# Patient Record
Sex: MALE | Race: WHITE | URBAN - METROPOLITAN AREA
[De-identification: names, ages, dates, MRNs, and addresses within clinical notes are randomized per-mention and may not be internally consistent; named-entity substitution may affect disease eponyms.]

---

## 2017-12-04 ENCOUNTER — OFFICE VISIT (OUTPATIENT)
Dept: DERMATOLOGY | Age: 47
End: 2017-12-04

## 2017-12-04 DIAGNOSIS — L57.0 AK (ACTINIC KERATOSIS): ICD-10-CM

## 2017-12-04 DIAGNOSIS — L21.9 SEBORRHEIC DERMATITIS: ICD-10-CM

## 2017-12-04 DIAGNOSIS — L81.2 FRECKLES: Primary | ICD-10-CM

## 2017-12-04 DIAGNOSIS — L82.1 SEBORRHEIC KERATOSES: ICD-10-CM

## 2017-12-04 DIAGNOSIS — D22.9 MULTIPLE NEVI: ICD-10-CM

## 2017-12-04 PROCEDURE — 17003 DESTRUCT PREMALG LES 2-14: CPT | Performed by: DERMATOLOGY

## 2017-12-04 PROCEDURE — 17000 DESTRUCT PREMALG LESION: CPT | Performed by: DERMATOLOGY

## 2017-12-04 PROCEDURE — 99203 OFFICE O/P NEW LOW 30 MIN: CPT | Performed by: DERMATOLOGY

## 2017-12-04 RX ORDER — ATORVASTATIN CALCIUM 20 MG/1
80 TABLET, FILM COATED ORAL
COMMUNITY
Start: 2017-11-29 | End: 2022-06-20

## 2017-12-04 RX ORDER — LOSARTAN POTASSIUM AND HYDROCHLOROTHIAZIDE 12.5; 1 MG/1; MG/1
1 TABLET ORAL
COMMUNITY
Start: 2017-03-14 | End: 2021-07-13

## 2017-12-04 RX ORDER — KETOCONAZOLE 20 MG/G
CREAM TOPICAL
Qty: 30 G | Refills: 1 | Status: SHIPPED | OUTPATIENT
Start: 2017-12-04 | End: 2020-01-09 | Stop reason: SDUPTHER

## 2017-12-04 NOTE — PROGRESS NOTES
Nails/digits and buttocks. The following were examined and determined to be abnormal: Head/face, RUE and LUE.   trunk and extremities with scattered brown macules and papules and tan macules  FA\"s with scattered erythematous roughly scaled macules  FH/glabella with mild ill-defined erythema and fine dry scale    Assessment and Plan     1. Freckles, few benign-appearing nevi and few small SK's  - educ re ABCD's of MM   educ sun protection   encouraged skin check yearly (sooner if indicated), self checks    2. AK's - arms  - 5 lesion(s) on the arms treated with liquid nitrogen x 2 cycles. Patient educated on risk of blister, hypopigmentation/scar and wound care.       3. seb derm - mild  - ketocon cream daily - bid

## 2018-12-04 ENCOUNTER — OFFICE VISIT (OUTPATIENT)
Dept: DERMATOLOGY | Age: 48
End: 2018-12-04
Payer: COMMERCIAL

## 2018-12-04 DIAGNOSIS — L21.9 SEBORRHEIC DERMATITIS: ICD-10-CM

## 2018-12-04 DIAGNOSIS — L73.9 FOLLICULITIS: ICD-10-CM

## 2018-12-04 DIAGNOSIS — D22.9 MULTIPLE NEVI: ICD-10-CM

## 2018-12-04 DIAGNOSIS — L57.0 AK (ACTINIC KERATOSIS): ICD-10-CM

## 2018-12-04 DIAGNOSIS — L81.2 FRECKLES: Primary | ICD-10-CM

## 2018-12-04 DIAGNOSIS — L82.1 SEBORRHEIC KERATOSIS: ICD-10-CM

## 2018-12-04 PROCEDURE — 99214 OFFICE O/P EST MOD 30 MIN: CPT | Performed by: DERMATOLOGY

## 2018-12-04 PROCEDURE — 17003 DESTRUCT PREMALG LES 2-14: CPT | Performed by: DERMATOLOGY

## 2018-12-04 PROCEDURE — 1036F TOBACCO NON-USER: CPT | Performed by: DERMATOLOGY

## 2018-12-04 PROCEDURE — 17000 DESTRUCT PREMALG LESION: CPT | Performed by: DERMATOLOGY

## 2018-12-04 PROCEDURE — G8484 FLU IMMUNIZE NO ADMIN: HCPCS | Performed by: DERMATOLOGY

## 2018-12-04 PROCEDURE — G8421 BMI NOT CALCULATED: HCPCS | Performed by: DERMATOLOGY

## 2018-12-04 PROCEDURE — G8427 DOCREV CUR MEDS BY ELIG CLIN: HCPCS | Performed by: DERMATOLOGY

## 2018-12-04 RX ORDER — CLINDAMYCIN PHOSPHATE 11.9 MG/ML
SOLUTION TOPICAL
Qty: 60 ML | Refills: 3 | Status: SHIPPED | OUTPATIENT
Start: 2018-12-04

## 2018-12-04 NOTE — PATIENT INSTRUCTIONS
Protecting Yourself From the Sun    · Apply broad spectrum water resistant sunscreen with an SPF of at least 30 to exposed areas of the skin. Dont forget the ears and lips! Remember to reapply sunscreen about every 2 hours and after swimming or sweating. · Wear sun protective clothing. Swim shirts (aka. rash guards) are a great idea and negates the need to reapply sunscreen in those areas. · Seek the shade whenever possible especially between the hours of 10 am and 4 pm when the suns rays are the strongest.     · Avoid tanning beds      Cryosurgery (Freezing) Wound Care Instructions    AFTER THE PROCEDURE:    You will notice swelling and redness around the site. This is normal.    You may experience a sharp or sore feeling for the next several days. For this discomfort, you may take acetaminophen (Tylenol©).  A blister may develop at the treated area, sometimes as soon as by the end of the day. After several days, the blister will subside and a scab will form.  If the area is bumped or traumatized during the first few days following freezing, you may develop bleeding into the blister, forming a blood blister. This is nothing to be alarmed about.  If the blister is tense, uncomfortable, or much larger than the site that was frozen, you may pop the blister along its edge with a sterile needle (boiled, heated under a flame, or cleaned with alcohol) to allow the fluid to drain out. If the blister does not bother you, no treatment is needed.  Do NOT peel off the top of the blister roof. It will act as a dressing on top of your wound. WOUND CARE:    You may shower or bathe as usual, but avoid scrubbing the areas that have been frozen.  Cleanse the site twice a day with mild soapy water, and then apply a thin film of white petrolatum (Vaseline©).  You do not need to cover the area, but can if you prefer.     Do NOT allow the site to become dry or crusted, or attempt to dry it out with

## 2020-01-09 ENCOUNTER — OFFICE VISIT (OUTPATIENT)
Dept: DERMATOLOGY | Age: 50
End: 2020-01-09
Payer: COMMERCIAL

## 2020-01-09 PROCEDURE — G8484 FLU IMMUNIZE NO ADMIN: HCPCS | Performed by: DERMATOLOGY

## 2020-01-09 PROCEDURE — 99214 OFFICE O/P EST MOD 30 MIN: CPT | Performed by: DERMATOLOGY

## 2020-01-09 PROCEDURE — G8421 BMI NOT CALCULATED: HCPCS | Performed by: DERMATOLOGY

## 2020-01-09 PROCEDURE — 11102 TANGNTL BX SKIN SINGLE LES: CPT | Performed by: DERMATOLOGY

## 2020-01-09 PROCEDURE — 17003 DESTRUCT PREMALG LES 2-14: CPT | Performed by: DERMATOLOGY

## 2020-01-09 PROCEDURE — 17000 DESTRUCT PREMALG LESION: CPT | Performed by: DERMATOLOGY

## 2020-01-09 PROCEDURE — G8427 DOCREV CUR MEDS BY ELIG CLIN: HCPCS | Performed by: DERMATOLOGY

## 2020-01-09 PROCEDURE — 1036F TOBACCO NON-USER: CPT | Performed by: DERMATOLOGY

## 2020-01-09 RX ORDER — KETOCONAZOLE 20 MG/G
CREAM TOPICAL
Qty: 30 G | Refills: 1 | Status: SHIPPED | OUTPATIENT
Start: 2020-01-09

## 2020-01-09 NOTE — PATIENT INSTRUCTIONS
Biopsy Wound Care Instructions    · Keep the bandage in place for 24 hours. · Cleanse the wound with mild soapy water daily   Gently dry the area.  Apply Vaseline or petroleum jelly to the wound using a cotton tipped applicator.  Cover with a clean bandage.  Repeat this process until the biopsy site is healed.  If you had stitches placed, continue treating the site until the stitches are removed. Remember to make an appointment to return to have your stitches removed by our staff.  You may shower and bathe as usual.       ** Biopsy results generally take around 7 business days to come back. If you have not heard from us by then, please call the office at [phone] between 8AM and 4PM Monday through Friday. Cryosurgery (Freezing) Wound Care Instructions    AFTER THE PROCEDURE:    You will notice swelling and redness around the site. This is normal.    You may experience a sharp or sore feeling for the next several days. For this discomfort, you may take acetaminophen (Tylenol©).  A blister may develop at the treated area, sometimes as soon as by the end of the day. After several days, the blister will subside and a scab will form.  If the area is bumped or traumatized during the first few days following freezing, you may develop bleeding into the blister, forming a blood blister. This is nothing to be alarmed about.  If the blister is tense, uncomfortable, or much larger than the site that was frozen, you may pop the blister along its edge with a sterile needle (boiled, heated under a flame, or cleaned with alcohol) to allow the fluid to drain out. If the blister does not bother you, no treatment is needed.  Do NOT peel off the top of the blister roof. It will act as a dressing on top of your wound. WOUND CARE:    You may shower or bathe as usual, but avoid scrubbing the areas that have been frozen.      Cleanse the site twice a day with mild soapy water, and then apply a

## 2020-01-09 NOTE — PROGRESS NOTES
[facility]  Ivy Smith MD  [address]   1970    54 y.o. male     Date of Visit: 1/9/2020    Last seen: ; his wife and son are patients as well    Chief Complaint: moles/lesions  Chief Complaint   Patient presents with    Skin Exam     History of Present Illness:    1. Here for evaluation of multiple asx pigmented lesions on the trunk and extremities, present for many years; no change in size/shape/color of any lesions; no bleeding lesions. 2. He has a few rough lesions on the face. Asx.     3. F/u seb derm - intermittently dry and flaky skin on the FH/glabella. Improved with ketocon cream. Hasn't used recently. Flaring some om the temples/FH. 4. He has a concerning lesion on the L nasal sidewall x 4 mos. Asx. Hx of folliculitis - scalp - no recent probs. No personal hx of skin cancer. Dad with hx of skin cancer - likely NMSC. Maternal grandmother with hx of several lesions on the lips. Wears sunscreen regularly. No tanning bed use. Works at [employer] in [employer]. Has 2 children - go to [employer]. Senior (wanting to go to med school) and [employer] in 2019 - 2020. Review of Systems:  Gen: Feels well, good sense of health. Skin: No changing moles or lesions. No new rashes. Past Medical History, Family History, Surgical History, Medications and Allergies reviewed. Outpatient Medications Marked as Taking for the 1/9/20 encounter (Office Visit) with David Herrera MD   Medication Sig Dispense Refill    atorvastatin (LIPITOR) 20 MG tablet Take 20 mg by mouth      losartan-hydrochlorothiazide (HYZAAR) 100-12.5 MG per tablet Take 1 tablet by mouth       Allergies   Allergen Reactions    Keflex [Cephalexin] Rash     Young child       Past Medical History:   Diagnosis Date    Hyperlipidemia     Hypertension      History reviewed. No pertinent surgical history.     Physical Examination     Gen, well-appearing  The following were examined and determined to be normal: Psych/Neuro, Scalp/hair, Conjunctivae/eyelids, Gums/teeth/lips, Neck, Breast/axilla/chest, Abdomen, Back, RLE, LLE, Nails/digits and buttocks. The following were examined and determined to be abnormal: Head/face, RUE and LUE.   trunk and extremities with scattered brown macules and papules and tan macules  Temples and FH with erythematous roughly scaled macules  FH/glabella clear  Scalp clear  L nasal sidewall with pink scaly slightly depressed macule  Temples and FH with ill-defined scaly erythemaotus skin    Assessment and Plan     1. Freckles, few benign-appearing nevi and few small SK's  - educ re ABCD's of MM   educ sun protection   encouraged skin check yearly (sooner if indicated), self checks    2. AK's - temples and FH  - 4 lesion(s) on the arms treated with liquid nitrogen x 2 cycles. Patient educated on risk of blister, hypopigmentation/scar and wound care. 3. seb derm - flaring mildly  - resume ketocon cream daily - bid    4. L nasal sidewall - r/o BCC  - Shave biopsy performed after verbal consent obtained. Patient educated regarding risk of bleeding, infection, scar and educated on wound care. Skin cleansed with alcohol pad and site anesthetized with lido + epi. Aluminum chloride applied to site for hemostasis. Petrolatum ointment and bandage applied. Specimen bottle labeled with patient information and site and specimen sent to dermpath.

## 2020-01-21 ENCOUNTER — TELEPHONE (OUTPATIENT)
Dept: DERMATOLOGY | Age: 50
End: 2020-01-21

## 2021-01-14 ENCOUNTER — OFFICE VISIT (OUTPATIENT)
Dept: DERMATOLOGY | Age: 51
End: 2021-01-14
Payer: COMMERCIAL

## 2021-01-14 VITALS — TEMPERATURE: 97.2 F

## 2021-01-14 DIAGNOSIS — L57.0 AK (ACTINIC KERATOSIS): ICD-10-CM

## 2021-01-14 DIAGNOSIS — L81.2 FRECKLES: Primary | ICD-10-CM

## 2021-01-14 DIAGNOSIS — L21.9 SEBORRHEIC DERMATITIS: ICD-10-CM

## 2021-01-14 DIAGNOSIS — D22.9 MULTIPLE NEVI: ICD-10-CM

## 2021-01-14 DIAGNOSIS — D48.5 NEOPLASM OF UNCERTAIN BEHAVIOR OF SKIN: ICD-10-CM

## 2021-01-14 DIAGNOSIS — L82.1 SEBORRHEIC KERATOSIS: ICD-10-CM

## 2021-01-14 PROCEDURE — 11102 TANGNTL BX SKIN SINGLE LES: CPT | Performed by: DERMATOLOGY

## 2021-01-14 PROCEDURE — 17003 DESTRUCT PREMALG LES 2-14: CPT | Performed by: DERMATOLOGY

## 2021-01-14 PROCEDURE — 99213 OFFICE O/P EST LOW 20 MIN: CPT | Performed by: DERMATOLOGY

## 2021-01-14 PROCEDURE — 17000 DESTRUCT PREMALG LESION: CPT | Performed by: DERMATOLOGY

## 2021-01-14 NOTE — PATIENT INSTRUCTIONS

## 2021-01-14 NOTE — PROGRESS NOTES
[facility]  Vidal Jensen MD  [address]   1970    52 y.o. male     Date of Visit: 1/14/2021    Last seen: 1-2020    Chief Complaint: moles/lesions  Chief Complaint   Patient presents with    Skin Lesion     FSE      HX: multi  nevi     History of Present Illness:    1. Here for evaluation of multiple asx pigmented lesions on the trunk and extremities, present for many years; no change in size/shape/color of any lesions; no bleeding lesions. 2. He has scattered rough lesions on the face. Asx. Hx of AK's.    3. F/u seb derm - intermittently dry and flaky skin on the FH/glabella. Improves some with ketocon cream. Uses intermittently. Flaring some om the temples/FH. 4. He has a concerning lesion on the L nasal sidewall since last year. bx'd 1-2020 - read as AK and SK. Still has a concerning papule here. Asx. Hx of folliculitis - scalp - no recent probs. No personal hx of skin cancer. Dad with hx of skin cancer - likely NMSC. Maternal grandmother with hx of several lesions on the lips. Wears sunscreen regularly. No tanning bed use. Works at [employer] in [employer]. Has 2 children - go to [employer]. Freshman at [employer] (wanting to go to med school) and HS senior in 2020 - 2021. His wife and son Gordy Valencia) are patients as well    Review of Systems:  Gen: Feels well, good sense of health. Skin: No changing moles or lesions. No new rashes. Past Medical History, Family History, Surgical History, Medications and Allergies reviewed.     Outpatient Medications Marked as Taking for the 1/14/21 encounter (Office Visit) with Delicia Bass MD   Medication Sig Dispense Refill    ketoconazole (NIZORAL) 2 % cream Apply to affected area BID PRN flares 30 g 1    atorvastatin (LIPITOR) 20 MG tablet Take 20 mg by mouth      losartan-hydrochlorothiazide (HYZAAR) 100-12.5 MG per tablet Take 1 tablet by mouth       Allergies   Allergen Reactions    Keflex [Cephalexin] Rash     Young child       Past Medical History:   Diagnosis Date    Hyperlipidemia     Hypertension      No past surgical history on file. Physical Examination     Gen, well-appearing  The following were examined and determined to be normal: Psych/Neuro, Scalp/hair, Conjunctivae/eyelids, Gums/teeth/lips, Neck, Breast/axilla/chest, Abdomen, Back, RLE, LLE, Nails/digits and buttocks. The following were examined and determined to be abnormal: Head/face, RUE and LUE.   trunk and extremities with scattered brown macules and papules and tan macules  Temples and FH with erythematous roughly scaled macules  Scalp clear  L nasal sidewall with pink scaly slightly depressed papule  Glabella, temples and FH with ill-defined scaly erythemaotus skin    Assessment and Plan     1. Freckles, benign-appearing nevi and few small SK's  - educ re si/sx/ABCD's of MM   educ sun protection - OTC sunscreen with SPF 30-50+ recommended and reviewed usage  encouraged skin check yearly (sooner if indicated), self checks    2. AK's - temples and FH  - 8 lesion(s) on the arms treated with liquid nitrogen x 2 cycles. Patient educated on risk of blister, hypopigmentation/scar and wound care. - consider efudex if still multiple lesions in the future  - cont sun protection as above    3. seb derm - mild and stable  - cont ketocon cream daily - bid  - add HC 1% bid prn flares; ed se/misuse    4. L nasal sidewall - r/o AK vs NMSC  - bx 1-2020 - read as AK  - Shave biopsy performed after verbal consent obtained. Patient educated regarding risk of bleeding, infection, scar and educated on wound care. Skin cleansed with alcohol pad and site anesthetized with lido + epi. Aluminum chloride applied to site for hemostasis. Petrolatum ointment and bandage applied. Specimen bottle labeled with patient information and site and specimen sent to [facility].

## 2021-01-25 ENCOUNTER — TELEPHONE (OUTPATIENT)
Dept: DERMATOLOGY | Age: 51
End: 2021-01-25

## 2021-01-25 DIAGNOSIS — C44.311 BASAL CELL CARCINOMA (BCC) OF SKIN OF NOSE: Primary | ICD-10-CM

## 2021-01-25 NOTE — TELEPHONE ENCOUNTER
Left voicemail to return call regarding bx result(s) of:    Left nasal sidewall-nodular basal cell carcinoma. To Dr. Antony Sierra for [facility].

## 2021-01-26 NOTE — TELEPHONE ENCOUNTER
I spoke with the patient today. He and Gudelia Roche keep missing each other. I gave him his biopsy results:     Left nasal sidewall-nodular basal cell carcinoma. To Dr. Felipe Waddell for [facility]. He will wait a week or so for Dr. Ayanna Sawyer office to schedule Mohs. He scheduled a 6 month tbse. If he has any questions, he will call back. If Gudelia Roche has anything to add, she may call him.

## 2021-02-04 ENCOUNTER — TELEPHONE (OUTPATIENT)
Dept: SURGERY | Age: 51
End: 2021-02-04

## 2021-02-08 ENCOUNTER — TELEPHONE (OUTPATIENT)
Dept: SURGERY | Age: 51
End: 2021-02-08

## 2021-03-31 ENCOUNTER — OFFICE VISIT (OUTPATIENT)
Dept: PRIMARY CARE CLINIC | Age: 51
End: 2021-03-31
Payer: COMMERCIAL

## 2021-03-31 DIAGNOSIS — Z01.818 PREOP EXAMINATION: Primary | ICD-10-CM

## 2021-03-31 LAB — SARS-COV-2: NOT DETECTED

## 2021-03-31 PROCEDURE — G8421 BMI NOT CALCULATED: HCPCS | Performed by: NURSE PRACTITIONER

## 2021-03-31 PROCEDURE — 99211 OFF/OP EST MAY X REQ PHY/QHP: CPT | Performed by: NURSE PRACTITIONER

## 2021-03-31 PROCEDURE — G8428 CUR MEDS NOT DOCUMENT: HCPCS | Performed by: NURSE PRACTITIONER

## 2021-04-05 ENCOUNTER — TELEPHONE (OUTPATIENT)
Dept: SURGERY | Age: 51
End: 2021-04-05

## 2021-04-05 NOTE — TELEPHONE ENCOUNTER
Confirmed with patient that covid test was negative and ok to proceed with Mohs surgery on 04/06/21.

## 2021-04-06 ENCOUNTER — PROCEDURE VISIT (OUTPATIENT)
Dept: SURGERY | Age: 51
End: 2021-04-06
Payer: COMMERCIAL

## 2021-04-06 VITALS — DIASTOLIC BLOOD PRESSURE: 100 MMHG | TEMPERATURE: 97.6 F | SYSTOLIC BLOOD PRESSURE: 157 MMHG | HEART RATE: 85 BPM

## 2021-04-06 DIAGNOSIS — C44.311 BASAL CELL CARCINOMA (BCC) OF LEFT NASAL SIDEWALL: Primary | ICD-10-CM

## 2021-04-06 PROCEDURE — 17311 MOHS 1 STAGE H/N/HF/G: CPT | Performed by: DERMATOLOGY

## 2021-04-06 NOTE — PATIENT INSTRUCTIONS
[facility] Office Hours:    Monday-Thursday  7:30 AM-4:30 PM    Friday  9:00 AM-1:00 PM    DAILY WOUND CARE-SECOND INTENTION    1.  Keep the area absolutely dry for 24 to 48 hours. -After 24 to 48 hours you may remove the bandage and shower. 2.  Remove the bandage and clean the wound with mild soap and water. Try to clean off crust and debris. -After one week, you may rub the surface of the wound fairly aggressively (a small amount of bleeding is normal when you do this). It is important not to allow a scab to form as scabs slow down the healing process. Dry the area with a clean Q-tip or gauze. 3.  Apply a layer of Vaseline (or Bacitracin if your doctor recommends) to the wound area  only. 4.  Cut a piece of Telfa (or any non-stick dressing) to fit just over the wound and secure it with paper tape. If the wound is small you may use a Band-Aid    You should continue daily wound care and keep a bandage on until new skin has grown over the entire wound    ? Bleeding: If bleeding occurs, DO NOT remove the bandage. Put firm pressure on the area with gauze for 20 minutes without peeking. If the bleeding continues, apply pressure for 20 minutes more. ? If the bleeding does not stop after you apply pressure, call us right away. If you cant call, go to the nearest emergency room or urgent care facility. POST-OPERATIVE INSTRUCTIONS     1. Activity: Do not lift anything heavier than a gallon of milk for 1 week. Also, avoid strenuous activity such as running, power walking or contact sports. 2.  Eating and drinking: Do not drink alcohol for 48 hours after your procedure. Alcohol increases the chances of bleeding. 3.  Medicines:  -If you have discomfort, take acetaminophen (Tylenol or Extra Strength Tylenol). Follow the instructions and warning on the bottle. -If your doctor has prescribed you an aspirin daily, please keep taking it.  Do not take extra aspirin or medicines containing aspirin (such as Oliva-Cowiche and Excedrin) for 48 hours after your procedure. 4.  Symptoms: You may have these symptoms. They are normal and should get better with time:  A. Swelling. Swelling usually increases for 24 to 48 hours after your procedure and then begins to improve. B.  Some soreness and redness around your wound. C.  Bruising which can last for up to 2 weeks    WHAT TO EXPECT FOR THE COMING WEEKS TO MONTHS  1. You may use make-up once the area is well healed. 2.  Vitamin E oil is NOT necessary. A good moisturizer is just as effective. 3.  Sunscreen IS necessary. Use at least an SPF 30 sunscreen daily- even in the winter.    -Scars take from 6 months to 1 year to fully mature. After the area has healed, it may be helpful to gently massage the area with a moisturizer, petroleum jelly (Vaseline) or Aquaphor. This helps to soften the scar tissue.   -The color of the scar will even out over time, but may remain pink for several months. Swelling may also remain for several months, especially if the area is on the legs.       Call us at [phone] right away if you have any of the following symptoms:   Bleeding that you cant stop (see above)   Pain that lasts longer than 48 hours   Your wound becomes more painful, red or hot   Bruising and swelling that does not improve within 48 hours or gets worse suddenly

## 2021-04-06 NOTE — PROGRESS NOTES
MOHS PROCEDURE NOTE    PHYSICIAN:  Mora Ervin. Efra Stephens MD    ASSISTANT: Jovanny Lassiter LPN     REFERRING PROVIDER:  Naun Ybarra MD    PREOPERATIVE DIAGNOSIS: Nodular Basal Cell Carcinoma     SPECIFIC MOHS INDICATIONS:  location    AUC SCORIN/9    POSTOPERATIVE DIAGNOSIS: SAME    LOCATION: Left nasal sidewall     OPERATIVE PROCEDURE:  MOHS MICROGRAPHIC SURGERY    RECONSTRUCTION OF DEFECT: Second Intention Wound Healing    PREOPERATIVE SIZE: 5x4 MM    DEFECT SIZE: 6x5 MM    LENGTH OF REPAIRED WOUND/SIZE OF FLAP/SIZE OF GRAFT:  N/A MM    ANESTHESIA:  2.5mL 1% lidocaine with epinephrine 1:100,000 buffered. EBL:  MINIMAL    DURATION OF PROCEDURE:  0.75 HOUR    POSTOPERATIVE OBSERVATION: 0.5 HOUR    SPECIMENS:  SEE MOHS MAP    COMPLICATIONS:  NONE    DESCRIPTION OF PROCEDURE:  The patient was given a mirror, as appropriate, and the biopsy site was identified, marked with a surgical marking pen, and verified by the patient. Options for treatment were discussed and the patient was informed that Mohs surgery was the selected treatment based on its lower recurrence rate, given the features listed above, as compared to other treatment modalities such as excision, radiation, or curettage, and agreed with this treatment plan. Risks and benefits including bruising, swelling, bleeding, infection, nerve injury, recurrence, and scarring were discussed with the patient prior to the procedure and a written consent detailing these and other risks was reviewed with the patient and signed. There was a time out for person and procedure verification. The surgical site was prepped with an antiseptic solution. Application of an antiseptic solution was repeated before each surgical stage. Stage I:  The clinically-apparent tumor was carefully defined and debulked, determining the edge of the surgical excision.     A thin layer of tumor-laden tissue was excised with a narrow margin of normal-appearing skin, using the technique of Mohs. A map was prepared to correspond to the area of skin from which it was excised. Hemostasis was achieved using electrosurgery. The wound was bandaged. The tissue was prepared for the cryostat and sectioned. 1 section(s) prepared. Each section was coded, cut, and stained for microscopic examination. The entire base and margins of the excised piece of tissue were examined by the surgeon. The tissue was examined to the level of subcut fat. No tumor was identified at the peripheral margins of stage I of microscopically controlled surgery. DEFECT MANAGEMENT:    REPAIR DESCRIPTION:  After discussion with the patient regarding the various options, it was decided to allow the defect to heal by second intention (granulation). Healing time, wound care and scarring were discussed with the patient and he/she feels capable of taking care of the wound. WOUND COVERAGE:  The wound was cleaned with normal saline solution, dried off, Aquaphor ointment was applied, and the wound was covered. A dressing was applied for stabilization and light pressure. The patient was given detailed oral and written instructions on postoperative care. There were no complications. The patient left the Unit in good medical condition. FOLLOW-UP:  Follow up in 2-3 weeks.

## 2021-04-07 ENCOUNTER — TELEPHONE (OUTPATIENT)
Dept: SURGERY | Age: 51
End: 2021-04-07

## 2021-04-07 NOTE — TELEPHONE ENCOUNTER
The patient was in the office on 4/6/2021 for Mohs located on the LT nasal sidewall with 2nd intention wound healing repair. The patient tolerated the procedure well and left the office in good condition. Pain level on post-operative day 1:  No pain just sore when went to bed last night,  level 3 and PT advised he did take Advil when going to bed to help if felt worse during night. The Advil took care of, rested well. Any bleeding episode that required pressure to be held, bandage change or a call to the office or MD?  yes - slight when changed bandage today, but very minimal on bandage, no pressure needed to stop and no call had to be made. Any other issues?:  no    A post-operative telephone call was placed at 2:10p, 4/7/2021, in order to check on the patient's recovery process. The patient reported doing well and had no complaints other than those listed above, if any. All of the patient's questions were answered. Advise to call w/any questions/concerns.

## 2021-04-20 ENCOUNTER — OFFICE VISIT (OUTPATIENT)
Dept: SURGERY | Age: 51
End: 2021-04-20
Payer: COMMERCIAL

## 2021-04-20 DIAGNOSIS — Z51.89 VISIT FOR WOUND CHECK: Primary | ICD-10-CM

## 2021-04-20 PROCEDURE — 99213 OFFICE O/P EST LOW 20 MIN: CPT | Performed by: DERMATOLOGY

## 2021-04-20 PROCEDURE — G8421 BMI NOT CALCULATED: HCPCS | Performed by: DERMATOLOGY

## 2021-04-20 PROCEDURE — G8427 DOCREV CUR MEDS BY ELIG CLIN: HCPCS | Performed by: DERMATOLOGY

## 2021-04-20 PROCEDURE — 1036F TOBACCO NON-USER: CPT | Performed by: DERMATOLOGY

## 2021-04-20 NOTE — PROGRESS NOTES
S: The patient is here for wound check s/p Mohs surgery on the left ala with second intent wound healing, 2 week(s) ago. The patient has no complaints. O:  The wound has healthy granulating base and minimal to no fibrin. No erythema/purulence/pain. A/P:  Chronic stable problem:  open wound of the left ala s/p Mohs surgery. Status: The wound is healing well by second intention. No s/sx infection/bleeding. Looks great. The area was cleansed with a gentle cleanser, a small amount of Aquaphor and a non-stick dressing applied. Detailed second intention wound care instructions reviewed with the patient including daily gentle cleansing with mild cleanser such as cetaphil, application of topical petrolatum or aquaphor and wound coverage. Reminder to remove excessive fibrin as necessary, best after cleansing when fibrin is less adherent. Pt education on how to perform this. Healing time discussed. The patient is scheduled for f/u wound check as needed. Probably only another 2-3 weeks until healed.     OTC Meds:  aquaphor or vaseline  Prescription Meds:  no  Co-morbid conditions affecting healing (I.e. tobacco, diabetes, pvd, peripheral edema, immunosuppression):  no

## 2021-07-13 ENCOUNTER — OFFICE VISIT (OUTPATIENT)
Dept: DERMATOLOGY | Age: 51
End: 2021-07-13
Payer: COMMERCIAL

## 2021-07-13 VITALS — TEMPERATURE: 97.8 F

## 2021-07-13 DIAGNOSIS — L57.0 AK (ACTINIC KERATOSIS): ICD-10-CM

## 2021-07-13 DIAGNOSIS — D22.9 MULTIPLE NEVI: ICD-10-CM

## 2021-07-13 DIAGNOSIS — L81.2 FRECKLES: Primary | ICD-10-CM

## 2021-07-13 DIAGNOSIS — L21.9 SEBORRHEIC DERMATITIS: ICD-10-CM

## 2021-07-13 DIAGNOSIS — L82.1 SEBORRHEIC KERATOSIS: ICD-10-CM

## 2021-07-13 PROCEDURE — 99213 OFFICE O/P EST LOW 20 MIN: CPT | Performed by: DERMATOLOGY

## 2021-07-13 PROCEDURE — G8427 DOCREV CUR MEDS BY ELIG CLIN: HCPCS | Performed by: DERMATOLOGY

## 2021-07-13 PROCEDURE — 1036F TOBACCO NON-USER: CPT | Performed by: DERMATOLOGY

## 2021-07-13 PROCEDURE — G8421 BMI NOT CALCULATED: HCPCS | Performed by: DERMATOLOGY

## 2021-07-13 PROCEDURE — 17003 DESTRUCT PREMALG LES 2-14: CPT | Performed by: DERMATOLOGY

## 2021-07-13 PROCEDURE — 17000 DESTRUCT PREMALG LESION: CPT | Performed by: DERMATOLOGY

## 2021-07-13 RX ORDER — AMLODIPINE BESYLATE 2.5 MG/1
2.5 TABLET ORAL DAILY
COMMUNITY

## 2021-07-13 RX ORDER — ASPIRIN 81 MG/1
81 TABLET, CHEWABLE ORAL DAILY
COMMUNITY
Start: 2021-06-01

## 2021-07-13 RX ORDER — RANOLAZINE 500 MG/1
TABLET, EXTENDED RELEASE ORAL
COMMUNITY
Start: 2021-06-28

## 2021-07-13 RX ORDER — CLOPIDOGREL BISULFATE 75 MG/1
TABLET ORAL
COMMUNITY
Start: 2021-06-23 | End: 2022-06-20

## 2021-07-13 NOTE — PROGRESS NOTES
2.5 MG tablet Take 2.5 mg by mouth daily      ketoconazole (NIZORAL) 2 % cream Apply to affected area BID PRN flares 30 g 1    clindamycin (CLEOCIN T) 1 % external solution Apply to affected area daily - BID prn flares on the scalp. 60 mL 3    atorvastatin (LIPITOR) 20 MG tablet Take 80 mg by mouth        Allergies   Allergen Reactions    Keflex [Cephalexin] Rash     Young child       Past Medical History:   Diagnosis Date    Hyperlipidemia     Hypertension      Past Surgical History:   Procedure Laterality Date    MOHS SURGERY  04/06/2021    NBCC, L nasal sidewall (Dr. Alvaro Lyons)       Physical Examination     Gen, well-appearing    FSE done today except for underwear-covered areas    trunk and extremities with scattered brown macules and papules and tan macules  Temples and FH with erythematous roughly scaled macules  Scalp clear  L nasal sidewall with subtle scar - clear  temples and FH with dry slightly scaled skin    Assessment and Plan     1. Freckles, benign-appearing nevi and few small SK's  - educ re si/sx/ABCD's of MM   educ sun protection - OTC sunscreen with SPF 30-50+ recommended and reviewed usage  encouraged skin check in 6-12 mos (sooner if indicated), self checks    2. AK's - temples and FH - slightly worse  - 9 lesion(s) on the arms treated with liquid nitrogen x 2 cycles. Patient educated on risk of blister, hypopigmentation/scar and wound care. - discussed efudex this winter if still multiple lesions remaining; ed irritation, erythema  - cont sun protection as above    3. Not addressed today: seb derm - mild and stable  - ketocon cream daily - bid  - HC 1% bid prn flares; ed se/misuse    4.  L nasal sidewall - BCC, no signs recurrence  - educ re si/sx/ABCD's of MM   educ sun protection - OTC sunscreen with SPF 30-50+ recommended and reviewed usage  encouraged skin check in 6-12 mos (sooner if indicated), self checks

## 2021-12-13 ENCOUNTER — OFFICE VISIT (OUTPATIENT)
Dept: DERMATOLOGY | Age: 51
End: 2021-12-13
Payer: COMMERCIAL

## 2021-12-13 VITALS — TEMPERATURE: 98.1 F

## 2021-12-13 DIAGNOSIS — L57.8 DIFFUSE PHOTODAMAGE OF SKIN: ICD-10-CM

## 2021-12-13 DIAGNOSIS — L57.0 AK (ACTINIC KERATOSIS): Primary | ICD-10-CM

## 2021-12-13 DIAGNOSIS — L81.2 FRECKLES: ICD-10-CM

## 2021-12-13 DIAGNOSIS — D22.9 MULTIPLE NEVI: ICD-10-CM

## 2021-12-13 DIAGNOSIS — Z85.828 HISTORY OF NONMELANOMA SKIN CANCER: ICD-10-CM

## 2021-12-13 DIAGNOSIS — L82.1 SEBORRHEIC KERATOSIS: ICD-10-CM

## 2021-12-13 PROCEDURE — G8484 FLU IMMUNIZE NO ADMIN: HCPCS | Performed by: DERMATOLOGY

## 2021-12-13 PROCEDURE — 1036F TOBACCO NON-USER: CPT | Performed by: DERMATOLOGY

## 2021-12-13 PROCEDURE — 99214 OFFICE O/P EST MOD 30 MIN: CPT | Performed by: DERMATOLOGY

## 2021-12-13 PROCEDURE — 17000 DESTRUCT PREMALG LESION: CPT | Performed by: DERMATOLOGY

## 2021-12-13 PROCEDURE — G8421 BMI NOT CALCULATED: HCPCS | Performed by: DERMATOLOGY

## 2021-12-13 PROCEDURE — G8427 DOCREV CUR MEDS BY ELIG CLIN: HCPCS | Performed by: DERMATOLOGY

## 2021-12-13 RX ORDER — ALIROCUMAB 75 MG/ML
INJECTION, SOLUTION SUBCUTANEOUS
COMMUNITY
Start: 2021-12-01

## 2021-12-13 RX ORDER — CALCIPOTRIENE 50 UG/G
CREAM TOPICAL
Qty: 60 G | Refills: 0 | Status: SHIPPED | OUTPATIENT
Start: 2021-12-13

## 2021-12-13 RX ORDER — FLUOROURACIL 50 MG/G
CREAM TOPICAL
Qty: 40 G | Refills: 0 | Status: SHIPPED | OUTPATIENT
Start: 2021-12-13

## 2021-12-13 RX ORDER — HYDROCHLOROTHIAZIDE 25 MG/1
TABLET ORAL
COMMUNITY
Start: 2021-11-17

## 2021-12-13 NOTE — PROGRESS NOTES
MG chewable tablet Take 81 mg by mouth daily      amLODIPine (NORVASC) 2.5 MG tablet Take 2.5 mg by mouth daily      clindamycin (CLEOCIN T) 1 % external solution Apply to affected area daily - BID prn flares on the scalp. 60 mL 3    atorvastatin (LIPITOR) 20 MG tablet Take 80 mg by mouth        Allergies   Allergen Reactions    Keflex [Cephalexin] Rash     Young child       Past Medical History:   Diagnosis Date    Hyperlipidemia     Hypertension      Past Surgical History:   Procedure Laterality Date    MOHS SURGERY  04/06/2021    NBCC, L nasal sidewall (Dr. Devon Garcia)       Physical Examination     Gen, well-appearing    FSE done today except for underwear-covered areas    trunk and extremities with scattered brown macules and papules and tan macules  Temples and FH with erythematous roughly scaled macules  Scalp clear  Nasal dorsum with erythematous roughly scaled macule    L nasal sidewall with subtle scar - clear  temples and FH with dry slightly scaled skin    Assessment and Plan     1. Freckles, benign-appearing nevi and few small SK's  - educ re si/sx/ABCD's of MM   educ sun protection - OTC sunscreen with SPF 30-50+ recommended and reviewed usage  encouraged skin check in 6-12 mos (sooner if indicated), self checks    2. AK's/chronic photodamage - temples and FH - slightly worse  - 1 discrete AK lesion(s) on the nose treated with liquid nitrogen x 2 cycles. Patient educated on risk of blister, hypopigmentation/scar and wound care. - start calcipotriene + efudex this winter for FH/temples for multiple lesions remaining; ed irritation, erythema  - cont sun protection as above    3.  L nasal sidewall - BCC, no signs recurrence  - educ re si/sx/ABCD's of MM   educ sun protection - OTC sunscreen with SPF 30-50+ recommended and reviewed usage  encouraged skin check in 6-12 mos (sooner if indicated), self checks    Not addressed today: seb derm - very mild and stable  - has ketocon cream daily - bid  - HC 1% bid prn flares; ed se/misuse

## 2022-06-20 ENCOUNTER — OFFICE VISIT (OUTPATIENT)
Dept: DERMATOLOGY | Age: 52
End: 2022-06-20
Payer: COMMERCIAL

## 2022-06-20 DIAGNOSIS — L82.1 SEBORRHEIC KERATOSIS: ICD-10-CM

## 2022-06-20 DIAGNOSIS — Z85.828 HISTORY OF NONMELANOMA SKIN CANCER: ICD-10-CM

## 2022-06-20 DIAGNOSIS — L57.8 DIFFUSE PHOTODAMAGE OF SKIN: ICD-10-CM

## 2022-06-20 DIAGNOSIS — L81.4 LENTIGINES: Primary | ICD-10-CM

## 2022-06-20 DIAGNOSIS — L57.0 AK (ACTINIC KERATOSIS): ICD-10-CM

## 2022-06-20 DIAGNOSIS — D22.9 MULTIPLE NEVI: ICD-10-CM

## 2022-06-20 PROCEDURE — 1036F TOBACCO NON-USER: CPT | Performed by: DERMATOLOGY

## 2022-06-20 PROCEDURE — G8421 BMI NOT CALCULATED: HCPCS | Performed by: DERMATOLOGY

## 2022-06-20 PROCEDURE — 17000 DESTRUCT PREMALG LESION: CPT | Performed by: DERMATOLOGY

## 2022-06-20 PROCEDURE — G8427 DOCREV CUR MEDS BY ELIG CLIN: HCPCS | Performed by: DERMATOLOGY

## 2022-06-20 PROCEDURE — 99214 OFFICE O/P EST MOD 30 MIN: CPT | Performed by: DERMATOLOGY

## 2022-06-20 PROCEDURE — 3017F COLORECTAL CA SCREEN DOC REV: CPT | Performed by: DERMATOLOGY

## 2022-06-20 RX ORDER — ATORVASTATIN CALCIUM 80 MG/1
80 TABLET, FILM COATED ORAL DAILY
COMMUNITY

## 2022-06-20 NOTE — PROGRESS NOTES
[facility]  Bailey Stahl MD  [address]  1970    50 y.o. male     Date of Visit: 6/20/2022    Last seen:     Chief Complaint: moles/lesions  Chief Complaint   Patient presents with    Skin Exam     6 mo FSE        HX: AK     History of Present Illness:    *Had an MI 5-2021, required 2 stents. On blood thinners now. 1. Here for evaluation of multiple asx pigmented lesions on the trunk and extremities, present for many years; no change in size/shape/color of any lesions; no bleeding lesions. 2. He has scattered chronic rough lesions on the face. Asx. Hx of AK's. Rec field trx at last visit but hasn't trx yet. Did  rx's.    3. F/u BCC on the L nasal sidewall s/p Mohs 4-2021. No probs since. Not addressed today: seb derm - intermittently dry and flaky skin on the FH/glabella. Improves some with ketocon cream. Uses intermittently. Hx of folliculitis - scalp - no recent probs. No personal hx of skin cancer. Dad with hx of skin cancer - likely NMSC. Maternal grandmother with hx of several lesions on the lips. Wears sunscreen regularly. No tanning bed use. Works at [employer] in [facility]. Has 2 children - go to [employer]. Freshman at [employer] (wanting to go to med school) and HS senior in 2020 - 2021. His wife and son Idris Benton are patients as well. Review of Systems:  Gen: Feels well, good sense of health. Skin: No changing moles or lesions. No new rashes. Past Medical History, Family History, Surgical History, Medications and Allergies reviewed.     Outpatient Medications Marked as Taking for the 6/20/22 encounter (Office Visit) with Vidhi Hernandez MD   Medication Sig Dispense Refill    atorvastatin (LIPITOR) 80 MG tablet Take 80 mg by mouth daily      PRALUENT 75 MG/ML SOAJ injection pen INJECT 75 MG UNDER THE SKIN EVERY 14 DAYS      hydroCHLOROthiazide (HYDRODIURIL) 25 MG tablet TAKE 1 TABLET(25 MG) BY MOUTH DAILY      calcipotriene (DOVONEX) 0.005 % cream Apply to affected area BID x 4-5 days. Use with 5-fluorouracil. 60 g 0    metoprolol tartrate (LOPRESSOR) 25 MG tablet Extended release      ranolazine (RANEXA) 500 MG extended release tablet TAKE 1 TABLET BY MOUTH TWICE DAILY      aspirin 81 MG chewable tablet Take 81 mg by mouth daily      amLODIPine (NORVASC) 2.5 MG tablet Take 2.5 mg by mouth daily      ketoconazole (NIZORAL) 2 % cream Apply to affected area BID PRN flares 30 g 1    clindamycin (CLEOCIN T) 1 % external solution Apply to affected area daily - BID prn flares on the scalp. 60 mL 3     Allergies   Allergen Reactions    Keflex [Cephalexin] Rash     Young child       Past Medical History:   Diagnosis Date    Hyperlipidemia     Hypertension      Past Surgical History:   Procedure Laterality Date    MOHS SURGERY  04/06/2021    NBCC, L nasal sidewall (Dr. Darron Duverney)       Physical Examination     Gen, well-appearing    FSE done today except for underwear-covered areas    trunk and extremities with scattered brown macules and papules and tan macules  Temples and FH with erythematous roughly scaled macules  Scalp clear  R zygoma/lateral canthal area with erythematous roughly scaled macule  L nasal sidewall with subtle scar - clear    Assessment and Plan     1. Lentigines, benign-appearing nevi and few small SK's  - Monitor for ABCD's of MM and si/sx of NMSC  Continue sun protection - OTC sunscreen with SPF 30-50+ recommended and reviewed usage  Encouraged skin check yearly (sooner if indicated), self checks    2. AK's/chronic photodamage - temples and FH - not at goal  - 1 discrete AK lesion(s) on the R zygoma/lateral canthal area treated with liquid nitrogen x 2 cycles. Patient educated on risk of blister, hypopigmentation/scar and wound care.    - start calcipotriene + efudex this fall/winter for FH/temples for multiple lesions remaining; ed irritation, erythema  - cont sun protection as above    3.  L nasal sidewall - BCC, no signs recurrence  - monitor for si/sx/ABCD's of MM   educ sun protection - OTC sunscreen with SPF 30-50+ recommended and reviewed usage  encouraged skin check in 6-12 mos (sooner if indicated), self checks    Not addressed today: seb derm - very mild and stable  - has ketocon cream daily - bid  - HC 1% bid prn flares; ed se/misuse

## 2022-12-13 ENCOUNTER — OFFICE VISIT (OUTPATIENT)
Dept: DERMATOLOGY | Age: 52
End: 2022-12-13
Payer: COMMERCIAL

## 2022-12-13 DIAGNOSIS — Z85.828 HISTORY OF NONMELANOMA SKIN CANCER: ICD-10-CM

## 2022-12-13 DIAGNOSIS — L57.0 AK (ACTINIC KERATOSIS): ICD-10-CM

## 2022-12-13 DIAGNOSIS — D22.9 MULTIPLE NEVI: ICD-10-CM

## 2022-12-13 DIAGNOSIS — L57.8 DIFFUSE PHOTODAMAGE OF SKIN: ICD-10-CM

## 2022-12-13 DIAGNOSIS — L81.4 LENTIGINES: Primary | ICD-10-CM

## 2022-12-13 DIAGNOSIS — L82.1 SEBORRHEIC KERATOSIS: ICD-10-CM

## 2022-12-13 PROCEDURE — G8427 DOCREV CUR MEDS BY ELIG CLIN: HCPCS | Performed by: DERMATOLOGY

## 2022-12-13 PROCEDURE — 3017F COLORECTAL CA SCREEN DOC REV: CPT | Performed by: DERMATOLOGY

## 2022-12-13 PROCEDURE — 99214 OFFICE O/P EST MOD 30 MIN: CPT | Performed by: DERMATOLOGY

## 2022-12-13 PROCEDURE — 17003 DESTRUCT PREMALG LES 2-14: CPT | Performed by: DERMATOLOGY

## 2022-12-13 PROCEDURE — 17000 DESTRUCT PREMALG LESION: CPT | Performed by: DERMATOLOGY

## 2022-12-13 PROCEDURE — 1036F TOBACCO NON-USER: CPT | Performed by: DERMATOLOGY

## 2022-12-13 PROCEDURE — G8484 FLU IMMUNIZE NO ADMIN: HCPCS | Performed by: DERMATOLOGY

## 2022-12-13 PROCEDURE — G8421 BMI NOT CALCULATED: HCPCS | Performed by: DERMATOLOGY

## 2022-12-13 NOTE — PROGRESS NOTES
[facility]  Catarino MD Chris  [address]   1970    50 y.o. male     Date of Visit: 12/13/2022    Last seen: 6-2022    Chief Complaint: moles/lesions  Chief Complaint   Patient presents with    Follow-up    6 Month Follow-Up     Has a spot on right side of nose     History of Present Illness:    *Had an MI 5-2021, required 2 stents. On blood thinners now. 1. Here for evaluation of multiple asx pigmented lesions on the trunk and extremities, present for many years; no change in size/shape/color of any lesions; no bleeding lesions. 2. He has scattered chronic rough lesions on the face. Asx. Hx of AK's. Rec field trx at last visit but hasn't trx yet. Did  rx's. New lesion in particular noted - R nasal bridge, FH, preauricular. Asx.     3. F/u BCC on the L nasal sidewall s/p Mohs 4-2021. No probs since. Not addressed today: seb derm - intermittently dry and flaky skin on the FH/glabella. Improves some with ketocon cream. Uses intermittently. Hx of folliculitis - scalp - no recent probs. No personal hx of skin cancer. Dad with hx of skin cancer - likely NMSC. Maternal grandmother with hx of several lesions on the lips. Wears sunscreen regularly. No tanning bed use. Works at [employer] in [facility]. Has 2 children - go to [employer]. Freshman at [employer] (wanting to go to med school) and HS senior in 2020 - 2021. His wife and son Bassem Palomares) are patients as well. Review of Systems:  Gen: Feels well, good sense of health. Skin: No changing moles or lesions. No new rashes. Past Medical History, Family History, Surgical History, Medications and Allergies reviewed.     Outpatient Medications Marked as Taking for the 12/13/22 encounter (Office Visit) with Oliver Gonzáles MD   Medication Sig Dispense Refill    atorvastatin (LIPITOR) 80 MG tablet Take 80 mg by mouth daily      PRALUENT 75 MG/ML SOAJ injection pen INJECT 75 MG UNDER THE SKIN EVERY 14 DAYS      metoprolol tartrate (LOPRESSOR) 25 MG tablet Extended release      aspirin 81 MG chewable tablet Take 81 mg by mouth daily      amLODIPine (NORVASC) 2.5 MG tablet Take 2.5 mg by mouth daily       Allergies   Allergen Reactions    Keflex [Cephalexin] Rash     Young child       Past Medical History:   Diagnosis Date    Hyperlipidemia     Hypertension      Past Surgical History:   Procedure Laterality Date    MOHS SURGERY  04/06/2021    NBCC, L nasal sidewall (Dr. Portia Cullen)       Physical Examination     Gen, well-appearing    FSE done today except for underwear-covered areas    trunk and extremities with scattered brown macules and papules and tan macules  Temples and FH with erythematous roughly scaled macules  Scalp clear  - R nasal bridge, FH, preauricular - 6 total - erythematous roughly scaled macules  L nasal sidewall with subtle scar - clear    Assessment and Plan     1. Lentigines, benign-appearing nevi and few small SK's  - Monitor for ABCD's of MM and si/sx of NMSC  Continue sun protection - OTC sunscreen with SPF 30-50+ recommended and reviewed usage  Encouraged skin check yearly (sooner if indicated), self checks    2. AK's/chronic photodamage - temples and FH - not at goal  - start calcipotriene + efudex bid x 4-5 days this winter for FH/temples for multiple lesions remaining; ed irritation, erythema    2b. R nasal bridge, FH, preauricular - 6 total - discrete AK lesion(s) - treated with liquid nitrogen with cryac or swab. Treated with 2 cycles for 1-5 seconds each after consent from patient. Patient educated on risk of blister, hypopigmentation/scar and wound care. Tolerated well. - cont sun protection as above    3.  L nasal sidewall - BCC, no signs recurrence  - monitor for si/sx/ABCD's of MM   educ sun protection - OTC sunscreen with SPF 30-50+ recommended and reviewed usage  encouraged skin check in 6-12 mos (sooner if indicated), self checks    Not addressed today: seb derm - very mild and stable  - has ketocon cream daily - bid  - HC 1% bid prn flares; ed se/misuse

## 2023-12-04 ENCOUNTER — OFFICE VISIT (OUTPATIENT)
Dept: DERMATOLOGY | Age: 53
End: 2023-12-04
Payer: COMMERCIAL

## 2023-12-04 DIAGNOSIS — D22.9 MULTIPLE NEVI: ICD-10-CM

## 2023-12-04 DIAGNOSIS — L57.0 AK (ACTINIC KERATOSIS): ICD-10-CM

## 2023-12-04 DIAGNOSIS — L57.8 DIFFUSE PHOTODAMAGE OF SKIN: ICD-10-CM

## 2023-12-04 DIAGNOSIS — L82.1 SEBORRHEIC KERATOSIS: ICD-10-CM

## 2023-12-04 DIAGNOSIS — Z85.828 HISTORY OF NONMELANOMA SKIN CANCER: ICD-10-CM

## 2023-12-04 DIAGNOSIS — L81.4 LENTIGINES: Primary | ICD-10-CM

## 2023-12-04 PROCEDURE — 3017F COLORECTAL CA SCREEN DOC REV: CPT | Performed by: DERMATOLOGY

## 2023-12-04 PROCEDURE — G8427 DOCREV CUR MEDS BY ELIG CLIN: HCPCS | Performed by: DERMATOLOGY

## 2023-12-04 PROCEDURE — 17003 DESTRUCT PREMALG LES 2-14: CPT | Performed by: DERMATOLOGY

## 2023-12-04 PROCEDURE — 99213 OFFICE O/P EST LOW 20 MIN: CPT | Performed by: DERMATOLOGY

## 2023-12-04 PROCEDURE — G8484 FLU IMMUNIZE NO ADMIN: HCPCS | Performed by: DERMATOLOGY

## 2023-12-04 PROCEDURE — 17000 DESTRUCT PREMALG LESION: CPT | Performed by: DERMATOLOGY

## 2023-12-04 PROCEDURE — 1036F TOBACCO NON-USER: CPT | Performed by: DERMATOLOGY

## 2023-12-04 PROCEDURE — G8421 BMI NOT CALCULATED: HCPCS | Performed by: DERMATOLOGY

## 2023-12-04 RX ORDER — CLOPIDOGREL BISULFATE 75 MG/1
75 TABLET ORAL DAILY
COMMUNITY
Start: 2022-06-21

## 2023-12-04 NOTE — PROGRESS NOTES
[facility]  MD Lauren May  1970    48 y.o. male     Date of Visit: 12/4/2023    Last seen:     Chief Complaint: moles/lesions  Chief Complaint   Patient presents with    Skin Exam     History of Present Illness:    *Had an MI 5-2021, required 2 stents. On blood thinners now. 1. Here for evaluation of multiple asx pigmented lesions on the trunk and extremities, present for many years; no change in size/shape/color of any lesions; no bleeding lesions. 2. He has scattered chronic rough lesions on the face. Treated with calcipotriene plus 5-fluorouracil in October 2023. Notes irritation/inflammation and healed well and feels smoother. He has a few scattered rough lesions remaining. 3. F/u BCC on the L nasal sidewall s/p Mohs 4-2021. No probs since. Not addressed today: seb derm - intermittently dry and flaky skin on the FH/glabella. Improves some with ketocon cream. Uses intermittently. Hx of folliculitis - scalp - no recent probs. No personal hx of skin cancer. Dad with hx of skin cancer - likely NMSC. Maternal grandmother with hx of several lesions on the lips. Wears sunscreen regularly. No tanning bed use. Works at [employer] in [facility]. Has 2 children - go to [employer]. Freshman at [employer] (wanting to go to med school) and HS senior in 2020 - 2021. His wife and son Merlyn Graham) are patients as well. Review of Systems:  Gen: Feels well, good sense of health. Skin: No changing moles or lesions. No new rashes. Past Medical History, Family History, Surgical History, Medications and Allergies reviewed.     Outpatient Medications Marked as Taking for the 12/4/23 encounter (Office Visit) with Tate Obrien MD   Medication Sig Dispense Refill    clopidogrel (PLAVIX) 75 MG tablet Take 1 tablet by mouth daily      atorvastatin (LIPITOR) 80 MG tablet Take 1 tablet by mouth daily      PRALUENT 75 MG/ML